# Patient Record
Sex: FEMALE | Race: WHITE
[De-identification: names, ages, dates, MRNs, and addresses within clinical notes are randomized per-mention and may not be internally consistent; named-entity substitution may affect disease eponyms.]

---

## 2019-08-23 ENCOUNTER — HOSPITAL ENCOUNTER (OUTPATIENT)
Dept: HOSPITAL 19 - SDCO | Age: 24
Discharge: HOME | End: 2019-08-23
Attending: SPECIALIST
Payer: OTHER GOVERNMENT

## 2019-08-23 VITALS — DIASTOLIC BLOOD PRESSURE: 74 MMHG | HEART RATE: 74 BPM | TEMPERATURE: 98.4 F | SYSTOLIC BLOOD PRESSURE: 117 MMHG

## 2019-08-23 VITALS — HEART RATE: 65 BPM | DIASTOLIC BLOOD PRESSURE: 69 MMHG | SYSTOLIC BLOOD PRESSURE: 107 MMHG | TEMPERATURE: 97.7 F

## 2019-08-23 VITALS — WEIGHT: 218.26 LBS | HEIGHT: 68 IN | BODY MASS INDEX: 33.08 KG/M2

## 2019-08-23 VITALS — HEART RATE: 60 BPM | SYSTOLIC BLOOD PRESSURE: 106 MMHG | DIASTOLIC BLOOD PRESSURE: 57 MMHG

## 2019-08-23 VITALS — HEART RATE: 76 BPM | DIASTOLIC BLOOD PRESSURE: 73 MMHG | SYSTOLIC BLOOD PRESSURE: 106 MMHG

## 2019-08-23 VITALS — HEART RATE: 60 BPM | SYSTOLIC BLOOD PRESSURE: 94 MMHG | DIASTOLIC BLOOD PRESSURE: 76 MMHG

## 2019-08-23 DIAGNOSIS — D50.9: ICD-10-CM

## 2019-08-23 DIAGNOSIS — R11.2: Primary | ICD-10-CM

## 2019-08-23 DIAGNOSIS — G43.709: ICD-10-CM

## 2019-08-23 DIAGNOSIS — R53.82: ICD-10-CM

## 2019-08-23 DIAGNOSIS — Z90.49: ICD-10-CM

## 2019-08-23 DIAGNOSIS — K92.1: ICD-10-CM

## 2019-08-23 DIAGNOSIS — K59.09: ICD-10-CM

## 2019-08-23 NOTE — NUR
PT RETURNED TO BAY 3 FROM ENDO TX ROOM PER CART. PT A/OX3. DENIES PAIN,
NAUSEA, DISCOMFORT. FRIEND AT BEDSIDE. PT OFFERED WATER AND IS TOLERATING
WELL.  WILL CONT TO MONITOR PROGRESSION

## 2019-08-23 NOTE — NUR
PT TOLERATED MUFFIN AND WATER WITHOUT DIFFICULTY. DENIES PAIN, NAUSEA OR
DISCOMFORT. A/OX3 AND TALKING WITH FRIEND. IV DC'D TO LEFT WRIST WITHOUT
DIFFICULTY. DISMISSAL INSTRUCTIONS GIVEN AND PT VOICES UNDERSTANDING. PT
AMBULATED OUT TO FRIEND'S VEHICLE WITHOUT DIFFICULTY.